# Patient Record
Sex: FEMALE | Race: WHITE | Employment: FULL TIME | URBAN - NONMETROPOLITAN AREA
[De-identification: names, ages, dates, MRNs, and addresses within clinical notes are randomized per-mention and may not be internally consistent; named-entity substitution may affect disease eponyms.]

---

## 2021-05-18 NOTE — PROGRESS NOTES
NPO after midnight except sip of water with heart/BP meds  Follow all instructions given by surgeon including medications to hold  Bring insurance card and photo ID  Shower the night before or morning of procedure with liquid antibacterial soap  Wear comfortable clothing  Do not bring jewelry or valuables  Bring list of medications with dosage and how often taken if not reviewed   needed at discharge at least 25years old  Call PAT at [phone] for questions    Instructed to call surgery center at [phone] upon arrival to speak with  before entering building. Covid screen due  at [facility] 6 to 7 days before procedure.  Pt plans to have completed on 5-18 at [facility] screening questionnaire complete and negative for symptoms or exposure see chart for documentation

## 2021-05-25 ENCOUNTER — ANESTHESIA (OUTPATIENT)
Dept: OPERATING ROOM | Age: 41
End: 2021-05-25
Payer: COMMERCIAL

## 2021-05-25 ENCOUNTER — ANESTHESIA EVENT (OUTPATIENT)
Dept: OPERATING ROOM | Age: 41
End: 2021-05-25
Payer: COMMERCIAL

## 2021-05-25 ENCOUNTER — HOSPITAL ENCOUNTER (OUTPATIENT)
Age: 41
Setting detail: OUTPATIENT SURGERY
Discharge: HOME | End: 2021-05-25
Attending: OBSTETRICS & GYNECOLOGY | Admitting: OBSTETRICS & GYNECOLOGY
Payer: COMMERCIAL

## 2021-05-25 VITALS
TEMPERATURE: 97.6 F | HEIGHT: 64 IN | OXYGEN SATURATION: 99 % | SYSTOLIC BLOOD PRESSURE: 105 MMHG | WEIGHT: 135 LBS | BODY MASS INDEX: 23.05 KG/M2 | DIASTOLIC BLOOD PRESSURE: 68 MMHG | HEART RATE: 62 BPM | RESPIRATION RATE: 15 BRPM

## 2021-05-25 VITALS
OXYGEN SATURATION: 98 % | SYSTOLIC BLOOD PRESSURE: 101 MMHG | DIASTOLIC BLOOD PRESSURE: 59 MMHG | RESPIRATION RATE: 11 BRPM

## 2021-05-25 DIAGNOSIS — N92.0 MENORRHAGIA WITH REGULAR CYCLE: Primary | ICD-10-CM

## 2021-05-25 LAB
PREGNANCY, URINE: NEGATIVE
PREGNANCY, URINE: NORMAL

## 2021-05-25 PROCEDURE — 6360000002 HC RX W HCPCS

## 2021-05-25 PROCEDURE — 3600000003 HC SURGERY LEVEL 3 BASE: Performed by: OBSTETRICS & GYNECOLOGY

## 2021-05-25 PROCEDURE — 3600000013 HC SURGERY LEVEL 3 ADDTL 15MIN: Performed by: OBSTETRICS & GYNECOLOGY

## 2021-05-25 PROCEDURE — 7100000001 HC PACU RECOVERY - ADDTL 15 MIN: Performed by: OBSTETRICS & GYNECOLOGY

## 2021-05-25 PROCEDURE — 6360000002 HC RX W HCPCS: Performed by: REGISTERED NURSE

## 2021-05-25 PROCEDURE — 7100000000 HC PACU RECOVERY - FIRST 15 MIN: Performed by: OBSTETRICS & GYNECOLOGY

## 2021-05-25 PROCEDURE — 88302 TISSUE EXAM BY PATHOLOGIST: CPT

## 2021-05-25 PROCEDURE — 88305 TISSUE EXAM BY PATHOLOGIST: CPT

## 2021-05-25 PROCEDURE — 7100000011 HC PHASE II RECOVERY - ADDTL 15 MIN: Performed by: OBSTETRICS & GYNECOLOGY

## 2021-05-25 PROCEDURE — 3700000000 HC ANESTHESIA ATTENDED CARE: Performed by: OBSTETRICS & GYNECOLOGY

## 2021-05-25 PROCEDURE — 2720000010 HC SURG SUPPLY STERILE: Performed by: OBSTETRICS & GYNECOLOGY

## 2021-05-25 PROCEDURE — 81025 URINE PREGNANCY TEST: CPT

## 2021-05-25 PROCEDURE — 3700000001 HC ADD 15 MINUTES (ANESTHESIA): Performed by: OBSTETRICS & GYNECOLOGY

## 2021-05-25 PROCEDURE — 7100000010 HC PHASE II RECOVERY - FIRST 15 MIN: Performed by: OBSTETRICS & GYNECOLOGY

## 2021-05-25 PROCEDURE — 2709999900 HC NON-CHARGEABLE SUPPLY: Performed by: OBSTETRICS & GYNECOLOGY

## 2021-05-25 PROCEDURE — 2500000003 HC RX 250 WO HCPCS: Performed by: OBSTETRICS & GYNECOLOGY

## 2021-05-25 RX ORDER — FENTANYL CITRATE 50 UG/ML
INJECTION, SOLUTION INTRAMUSCULAR; INTRAVENOUS PRN
Status: DISCONTINUED | OUTPATIENT
Start: 2021-05-25 | End: 2021-05-25 | Stop reason: SDUPTHER

## 2021-05-25 RX ORDER — FENTANYL CITRATE 50 UG/ML
25 INJECTION, SOLUTION INTRAMUSCULAR; INTRAVENOUS EVERY 5 MIN PRN
Status: DISCONTINUED | OUTPATIENT
Start: 2021-05-25 | End: 2021-05-25 | Stop reason: HOSPADM

## 2021-05-25 RX ORDER — MEPERIDINE HYDROCHLORIDE 25 MG/ML
12.5 INJECTION INTRAMUSCULAR; INTRAVENOUS; SUBCUTANEOUS EVERY 5 MIN PRN
Status: DISCONTINUED | OUTPATIENT
Start: 2021-05-25 | End: 2021-05-25 | Stop reason: HOSPADM

## 2021-05-25 RX ORDER — DEXAMETHASONE SODIUM PHOSPHATE 10 MG/ML
INJECTION, EMULSION INTRAMUSCULAR; INTRAVENOUS PRN
Status: DISCONTINUED | OUTPATIENT
Start: 2021-05-25 | End: 2021-05-25 | Stop reason: SDUPTHER

## 2021-05-25 RX ORDER — LABETALOL 20 MG/4 ML (5 MG/ML) INTRAVENOUS SYRINGE
5 EVERY 10 MIN PRN
Status: DISCONTINUED | OUTPATIENT
Start: 2021-05-25 | End: 2021-05-25 | Stop reason: HOSPADM

## 2021-05-25 RX ORDER — HYDROCODONE BITARTRATE AND ACETAMINOPHEN 5; 325 MG/1; MG/1
1 TABLET ORAL EVERY 4 HOURS PRN
Status: DISCONTINUED | OUTPATIENT
Start: 2021-05-25 | End: 2021-05-25 | Stop reason: HOSPADM

## 2021-05-25 RX ORDER — SUCCINYLCHOLINE CHLORIDE 20 MG/ML
INJECTION INTRAMUSCULAR; INTRAVENOUS PRN
Status: DISCONTINUED | OUTPATIENT
Start: 2021-05-25 | End: 2021-05-25 | Stop reason: SDUPTHER

## 2021-05-25 RX ORDER — IBUPROFEN 800 MG/1
800 TABLET ORAL EVERY 6 HOURS PRN
Status: DISCONTINUED | OUTPATIENT
Start: 2021-05-25 | End: 2021-05-25 | Stop reason: HOSPADM

## 2021-05-25 RX ORDER — MORPHINE SULFATE 2 MG/ML
2 INJECTION, SOLUTION INTRAMUSCULAR; INTRAVENOUS
Status: DISCONTINUED | OUTPATIENT
Start: 2021-05-25 | End: 2021-05-25 | Stop reason: HOSPADM

## 2021-05-25 RX ORDER — ACETAMINOPHEN 325 MG/1
650 TABLET ORAL EVERY 4 HOURS PRN
Status: DISCONTINUED | OUTPATIENT
Start: 2021-05-25 | End: 2021-05-25 | Stop reason: HOSPADM

## 2021-05-25 RX ORDER — MIDAZOLAM HYDROCHLORIDE 1 MG/ML
INJECTION INTRAMUSCULAR; INTRAVENOUS PRN
Status: DISCONTINUED | OUTPATIENT
Start: 2021-05-25 | End: 2021-05-25 | Stop reason: SDUPTHER

## 2021-05-25 RX ORDER — ONDANSETRON 2 MG/ML
INJECTION INTRAMUSCULAR; INTRAVENOUS PRN
Status: DISCONTINUED | OUTPATIENT
Start: 2021-05-25 | End: 2021-05-25 | Stop reason: SDUPTHER

## 2021-05-25 RX ORDER — HYDROCODONE BITARTRATE AND ACETAMINOPHEN 5; 325 MG/1; MG/1
1 TABLET ORAL EVERY 6 HOURS PRN
Qty: 12 TABLET | Refills: 0 | Status: SHIPPED | OUTPATIENT
Start: 2021-05-25 | End: 2021-05-28

## 2021-05-25 RX ORDER — HYDROCODONE BITARTRATE AND ACETAMINOPHEN 5; 325 MG/1; MG/1
2 TABLET ORAL EVERY 4 HOURS PRN
Status: DISCONTINUED | OUTPATIENT
Start: 2021-05-25 | End: 2021-05-25 | Stop reason: HOSPADM

## 2021-05-25 RX ORDER — MORPHINE SULFATE 2 MG/ML
4 INJECTION, SOLUTION INTRAMUSCULAR; INTRAVENOUS
Status: DISCONTINUED | OUTPATIENT
Start: 2021-05-25 | End: 2021-05-25 | Stop reason: HOSPADM

## 2021-05-25 RX ORDER — PROPOFOL 10 MG/ML
INJECTION, EMULSION INTRAVENOUS PRN
Status: DISCONTINUED | OUTPATIENT
Start: 2021-05-25 | End: 2021-05-25 | Stop reason: SDUPTHER

## 2021-05-25 RX ORDER — PROMETHAZINE HYDROCHLORIDE 25 MG/ML
12.5 INJECTION, SOLUTION INTRAMUSCULAR; INTRAVENOUS
Status: DISCONTINUED | OUTPATIENT
Start: 2021-05-25 | End: 2021-05-25 | Stop reason: HOSPADM

## 2021-05-25 RX ORDER — KETOROLAC TROMETHAMINE 30 MG/ML
INJECTION, SOLUTION INTRAMUSCULAR; INTRAVENOUS PRN
Status: DISCONTINUED | OUTPATIENT
Start: 2021-05-25 | End: 2021-05-25 | Stop reason: SDUPTHER

## 2021-05-25 RX ORDER — SODIUM CHLORIDE, SODIUM LACTATE, POTASSIUM CHLORIDE, CALCIUM CHLORIDE 600; 310; 30; 20 MG/100ML; MG/100ML; MG/100ML; MG/100ML
INJECTION, SOLUTION INTRAVENOUS SEE ADMIN INSTRUCTIONS
Status: DISCONTINUED | OUTPATIENT
Start: 2021-05-25 | End: 2021-05-25 | Stop reason: HOSPADM

## 2021-05-25 RX ORDER — KETOROLAC TROMETHAMINE 30 MG/ML
INJECTION, SOLUTION INTRAMUSCULAR; INTRAVENOUS
Status: COMPLETED
Start: 2021-05-25 | End: 2021-05-25

## 2021-05-25 RX ORDER — BUPIVACAINE HYDROCHLORIDE 5 MG/ML
INJECTION, SOLUTION PERINEURAL PRN
Status: DISCONTINUED | OUTPATIENT
Start: 2021-05-25 | End: 2021-05-25 | Stop reason: ALTCHOICE

## 2021-05-25 RX ORDER — FENTANYL CITRATE 50 UG/ML
50 INJECTION, SOLUTION INTRAMUSCULAR; INTRAVENOUS EVERY 5 MIN PRN
Status: DISCONTINUED | OUTPATIENT
Start: 2021-05-25 | End: 2021-05-25 | Stop reason: HOSPADM

## 2021-05-25 RX ADMIN — FENTANYL CITRATE 100 MCG: 50 INJECTION, SOLUTION INTRAMUSCULAR; INTRAVENOUS at 08:15

## 2021-05-25 RX ADMIN — KETOROLAC TROMETHAMINE 30 MG: 30 INJECTION, SOLUTION INTRAMUSCULAR at 08:53

## 2021-05-25 RX ADMIN — DEXAMETHASONE SODIUM PHOSPHATE 10 MG: 10 INJECTION, EMULSION INTRAMUSCULAR; INTRAVENOUS at 08:15

## 2021-05-25 RX ADMIN — PROPOFOL 200 MG: 10 INJECTION, EMULSION INTRAVENOUS at 08:15

## 2021-05-25 RX ADMIN — KETOROLAC TROMETHAMINE: 30 INJECTION, SOLUTION INTRAMUSCULAR at 08:08

## 2021-05-25 RX ADMIN — SUCCINYLCHOLINE CHLORIDE 120 MG: 20 INJECTION, SOLUTION INTRAMUSCULAR; INTRAVENOUS at 08:18

## 2021-05-25 RX ADMIN — MIDAZOLAM 2 MG: 1 INJECTION INTRAMUSCULAR; INTRAVENOUS at 08:15

## 2021-05-25 RX ADMIN — ONDANSETRON HYDROCHLORIDE 4 MG: 4 INJECTION, SOLUTION INTRAMUSCULAR; INTRAVENOUS at 08:53

## 2021-05-25 ASSESSMENT — PULMONARY FUNCTION TESTS
PIF_VALUE: 2
PIF_VALUE: 20
PIF_VALUE: 12
PIF_VALUE: 12
PIF_VALUE: 2
PIF_VALUE: 12
PIF_VALUE: 2
PIF_VALUE: 21
PIF_VALUE: 12
PIF_VALUE: 0
PIF_VALUE: 1
PIF_VALUE: 2
PIF_VALUE: 2
PIF_VALUE: 12
PIF_VALUE: 13
PIF_VALUE: 0
PIF_VALUE: 1
PIF_VALUE: 1
PIF_VALUE: 21
PIF_VALUE: 12
PIF_VALUE: 2
PIF_VALUE: 2
PIF_VALUE: 13
PIF_VALUE: 1
PIF_VALUE: 20
PIF_VALUE: 21
PIF_VALUE: 2
PIF_VALUE: 12
PIF_VALUE: 0
PIF_VALUE: 12
PIF_VALUE: 13
PIF_VALUE: 12

## 2021-05-25 ASSESSMENT — PAIN SCALES - GENERAL
PAINLEVEL_OUTOF10: 0
PAINLEVEL_OUTOF10: 2

## 2021-05-25 ASSESSMENT — PAIN DESCRIPTION - LOCATION: LOCATION: ABDOMEN

## 2021-05-25 ASSESSMENT — PAIN - FUNCTIONAL ASSESSMENT: PAIN_FUNCTIONAL_ASSESSMENT: 0-10

## 2021-05-25 ASSESSMENT — PAIN DESCRIPTION - DESCRIPTORS: DESCRIPTORS: BURNING;CRAMPING

## 2021-05-25 ASSESSMENT — PAIN DESCRIPTION - PAIN TYPE: TYPE: SURGICAL PAIN

## 2021-05-25 NOTE — H&P
Department of  Obstetrics and Gynecology  History and Physical  Date of Admission:  2021    CHIEF COMPLAINT:   Heavy bleeding    History obtained from patient    HISTORY OF PRESENT ILLNESS:     The patient is a 45 y.o. female with significant past medical history of heavy bleeding who presents with worsening sx, no improvement with mirena. She has completed child bearing and desires surgical management. Past Medical History:    History reviewed. No pertinent past medical history. Past Surgical History:        Procedure Laterality Date     SECTION      DILATION AND CURETTAGE OF UTERUS      x2         meds:No current facility-administered medications for this encounter. Allergies:  Bravelle [urofollitropin]     Social History:  TOBACCO:   reports that she has never smoked. She has never used smokeless tobacco.  ETOH:   reports current alcohol use. DRUGS:   reports no history of drug use. Family History:   History reviewed. No pertinent family history.      PHYSICAL EXAM:    Vitals:  /69   Pulse 64   Temp 97.9 °F (36.6 °C) (Temporal)   Resp 16   Ht 5' 4\" (1.626 m)   Wt 135 lb (61.2 kg)   LMP 2021   SpO2 100%   BMI 23.17 kg/m²     CONSTITUTIONAL:  awake, alert, cooperative, no apparent distress, and appears stated age  ABDOMEN:  Soft, NT  {GYN PELVIC EXAM: see office exam    DATA:  Labs:  CBC: No results found for: WBC, RBC, HGB, HCT, MCV, RDW, PLT  1st pregnancy test entered in error it was Negative      IMPRESSION/RECOMMENDATIONS:   41yo with menorrhagia  - Plan for L/S BS, hysteroscopy, D&C, Tessa Hill MD

## 2021-05-25 NOTE — BRIEF OP NOTE
Brief Operative Report      Pre-operative Diagnosis:  menorrhagia    Post-operative Diagnosis:  Same    Procedure:  L/S BS, Hysteroscopy, D&C, IUD removal, YAO    Surgeon: KAYLEE Kwan MD     Anesthesia:  General endotrachial anesthesia    Estimated blood loss:  Minimal     Findings: See Operative Dictation, Normal adnexa, 1cm right paratubal cyst    Complications:  none      See dictated operative report for full details.       Judie Rendon MD

## 2021-05-25 NOTE — ANESTHESIA PRE PROCEDURE
Department of Anesthesiology  Preprocedure Note       Name:  Una Fleischer   Age:  45 y.o.  :  1980                                          MRN:  [id number]         Date:  2021      Surgeon: Eugenio Monreal):  Juan Carlos Rothman MD    Procedure: Procedure(s):  D & C, HYSTEROSCOPY, ENDOMETRIAL ABLATION, LAPAROSCOPIC BILAT SALPINGECTOMY    Medications prior to admission:   Prior to Admission medications    Medication Sig Start Date End Date Taking? Authorizing Provider   [employer], Dioscorea villosa, (WILD YAM PO) Take  by mouth. Historical Provider, MD       Current medications:    No current facility-administered medications for this encounter. Allergies: Allergies   Allergen Reactions    Bravelle [Urofollitropin] Swelling       Problem List:    Patient Active Problem List   Diagnosis Code    Other procreative management counseling and advice Z31.69    Recurrent pregnancy loss, antepartum condition or complication C97.47    Hereditary disease in family possibly affecting fetus, affecting management of mother, antepartum condition or complication N72. 2XX0    Other current maternal conditions classifiable elsewhere, antepartum O99.891    Coagulation defect affecting pregnancy, antepartum ([facility] [address] O99.119, D68.9       Past Medical History:  No past medical history on file. Past Surgical History:        Procedure Laterality Date     SECTION      DILATION AND CURETTAGE OF UTERUS      x2       Social History:    Social History     Tobacco Use    Smoking status: Former Smoker     Quit date:      Years since quittin.4    Smokeless tobacco: Never Used   Substance Use Topics    Alcohol use:  Yes                                Counseling given: Not Answered      Vital Signs (Current):   Vitals:    21 0850 21 0724   BP:  102/69   Pulse:  64   Resp:  16   Temp:  97.9 °F (36.6 °C)   TempSrc:  Temporal   SpO2:  100%   Weight: 135 lb (61.2 kg) 135 lb (61.2 kg)   Height: 5' 4\" intravenous. MIPS: Postoperative opioids intended and Prophylactic antiemetics administered. Anesthetic plan and risks discussed with patient. Plan discussed with CRNA.                   [facility], DO   5/25/2021

## 2021-05-25 NOTE — PROGRESS NOTES
2465  at side. Easy to arouse, sleeping with easy respirations. Dimas Jaylongger reapplied in phase 2. Denies needs. States pain 3-4/10. Pain meds offered if needed. Refused at this time. 0932 Snacks given, sleeping. 1000 Patient remains sleeping, awakens on occasion for drinks. Denies needs. 1033 Reactive, sleeping well, states \"feel pretty good\"  [id number] Contacts for eyes given per , patient denies needs. Taking water and snacks. 801 [facility] for discharge, héctor pad given. Old pad with small amount cherry drainage. New pad given. 1115 Discharge instructions given, understanding voiced, questions answered. 1125 Out to car after voiding without difficulty. Denies needs.

## 2021-05-25 NOTE — OP NOTE
Gyn Service    Operative Report        Pt Name: Cassandra Shankar  MRN: [id number] [place] #: [de-identified]  YOB: 1980  Procedure Performed By: Cheryl Morrison MD, MD      Pre-operative Diagnosis:  MENORRHAGIA/MENOMETORRHAGIA    Post-operative Diagnosis:  SAME    Procedure:  HYSTEROSCOPY, D AND C, CAITLYN ENDOMETRIAL ABLATION, L/S BILATERAL SALPINGECTOMY, IUD REMOVAL    Surgeon:  KAYLEE Vergara MD     Anesthesia:  General    Estimated blood loss: less than 50 ml    Findings:  Ut sound to 10 cm     Complications:  NONE      Procedure: The patient was taken to the operating room where she was placed  under general anesthesia in dorsolithotomy position, prepped and draped. On  exam, the cervix was mobile and in mid position. The cervix was grasped with  a tenaculum and sounded to 10 cm and was progressively dilated. A manipulator was placed in the uterine cavity and attention was turned to the abdomen. The umbilicus was injected with marcaine and incised with a scalpel. A 5mm trochar was placed into the abdomen and it was insuflated with CO2 gas. A 2nd and 3rd 5mm trocar were placed in the suprapubic region and LLQ. The atraumatic grasper was uses to grasp the Fallopian tubes and they were ligated along the mesosalpinx with the Ligasure device to the level of the cornua where they were transected. Both tubes were removed through the suprapubic port. There was a 1cm simple right paratubal cyst that was removed with the specimen. The abdomen was manually exuflated and the skin was closed with 4-0 vicryl subcuticular stitch. Attention was then turned to the vaginal portion of the procedure. The IUD was grasped with a ring and removed in tact. A hysteroscope was placed and saline was used as the distension medium. Abnormalities of the uterus were not noted. A sharp curettage then took place until a gritty texture was noted throughout the cavity.   The Caitlyn device  was then placed, the intracervical balloon inflated, and the cavity assessment was passed successfully. The ablation was then completed. Once completed the balloon was deflated and the Caitlyn removed from the uterine cavity. When it was completed the patient was awakened from general anesthesia and taken to the Recovery Room in good condition. Sponge, lap and needle counts were correct x2.      @[facility]@

## 2022-11-17 ENCOUNTER — HOSPITAL ENCOUNTER (EMERGENCY)
Age: 42
Discharge: HOME | End: 2022-11-17
Attending: EMERGENCY MEDICINE
Payer: COMMERCIAL

## 2022-11-17 VITALS
DIASTOLIC BLOOD PRESSURE: 72 MMHG | HEART RATE: 85 BPM | WEIGHT: 140 LBS | SYSTOLIC BLOOD PRESSURE: 100 MMHG | TEMPERATURE: 97.4 F | HEIGHT: 64 IN | RESPIRATION RATE: 18 BRPM | BODY MASS INDEX: 23.9 KG/M2 | OXYGEN SATURATION: 96 %

## 2022-11-17 DIAGNOSIS — N30.01 ACUTE CYSTITIS WITH HEMATURIA: Primary | ICD-10-CM

## 2022-11-17 LAB
AMORPHOUS: ABNORMAL
BACTERIA: ABNORMAL
BILIRUBIN URINE: NEGATIVE
BLOOD, URINE: ABNORMAL
CASTS UA: ABNORMAL /LPF
CHARACTER, URINE: ABNORMAL
COLOR: YELLOW
CRYSTALS, UA: ABNORMAL
EPITHELIAL CELLS, UA: ABNORMAL /HPF
GLUCOSE, URINE: NEGATIVE MG/DL
KETONES, URINE: NEGATIVE
LEUKOCYTE ESTERASE, URINE: ABNORMAL
MUCUS: ABNORMAL
NITRITE, URINE: NEGATIVE
PH UA: 6 (ref 5–9)
PROTEIN UA: ABNORMAL MG/DL
RBC UA: ABNORMAL /HPF
REFLEX TO URINE C & S: ABNORMAL
SPECIFIC GRAVITY UA: 1.01 (ref 1–1.03)
UROBILINOGEN, URINE: 0.2 EU/DL (ref 0–1)
WBC UA: ABNORMAL /HPF

## 2022-11-17 PROCEDURE — 81001 URINALYSIS AUTO W/SCOPE: CPT

## 2022-11-17 PROCEDURE — 87086 URINE CULTURE/COLONY COUNT: CPT

## 2022-11-17 PROCEDURE — 99283 EMERGENCY DEPT VISIT LOW MDM: CPT | Performed by: EMERGENCY MEDICINE

## 2022-11-17 PROCEDURE — 6370000000 HC RX 637 (ALT 250 FOR IP): Performed by: EMERGENCY MEDICINE

## 2022-11-17 RX ORDER — PHENAZOPYRIDINE HYDROCHLORIDE 100 MG/1
100 TABLET, FILM COATED ORAL ONCE
Status: COMPLETED | OUTPATIENT
Start: 2022-11-17 | End: 2022-11-17

## 2022-11-17 RX ORDER — ONDANSETRON 4 MG/1
4 TABLET, ORALLY DISINTEGRATING ORAL EVERY 8 HOURS PRN
Qty: 15 TABLET | Refills: 0 | Status: SHIPPED | OUTPATIENT
Start: 2022-11-17

## 2022-11-17 RX ORDER — SULFAMETHOXAZOLE AND TRIMETHOPRIM 800; 160 MG/1; MG/1
1 TABLET ORAL 2 TIMES DAILY
Qty: 14 TABLET | Refills: 0 | Status: SHIPPED | OUTPATIENT
Start: 2022-11-17 | End: 2022-11-24

## 2022-11-17 RX ORDER — SULFAMETHOXAZOLE AND TRIMETHOPRIM 800; 160 MG/1; MG/1
1 TABLET ORAL ONCE
Status: COMPLETED | OUTPATIENT
Start: 2022-11-17 | End: 2022-11-17

## 2022-11-17 RX ORDER — PHENAZOPYRIDINE HYDROCHLORIDE 100 MG/1
100 TABLET, FILM COATED ORAL 3 TIMES DAILY PRN
Qty: 10 TABLET | Refills: 0 | Status: SHIPPED | OUTPATIENT
Start: 2022-11-17 | End: 2022-11-20

## 2022-11-17 RX ADMIN — PHENAZOPYRIDINE HYDROCHLORIDE 100 MG: 100 TABLET ORAL at 20:11

## 2022-11-17 RX ADMIN — SULFAMETHOXAZOLE AND TRIMETHOPRIM 1 TABLET: 800; 160 TABLET ORAL at 20:11

## 2022-11-17 NOTE — Clinical Note
Toma Godinez was seen and treated in our emergency department on 11/17/2022. She may return to work on 11/18/2022. If you have any questions or concerns, please don't hesitate to call.       Janes Bonilla RN

## 2022-11-18 NOTE — ED NOTES
meds administered. Education complete. Discharge teaching and instructions for condition explained to patient. AVS reviewed. Went over prescriptions with patient. Patient voiced understanding regarding prescriptions, follow up appointments and care of self at home. Pt discharged to home in stable condition per self.        Vitor Luong RN  11/17/22 2015

## 2022-11-18 NOTE — DISCHARGE INSTRUCTIONS
Increase fluids at home. Take Bactrim twice a day. Next dose tomorrow morning. Take Zofran for any nausea. Take prior to decrease pain or burning. Next dose tomorrow. Monitor for high fever worsening symptoms or progression.

## 2022-11-18 NOTE — ED PROVIDER NOTES
[address] [facility]  [place] [address]  Phone: [address]    Chief Complaint   Patient presents with    Dysuria    Urinary Frequency       HPI    Carmen Byrd is a 40 y.o. female who presents above-noted complaint. Patient actually had a rough week. General sore throat not feeling good. She now has developed some urinary symptoms. This is her first day without a fever. She denies headache neck pain chest pain or other problems. PAST MEDICAL HISTORY    Past Medical History:   Diagnosis Date    Depression        SURGICAL HISTORY    Past Surgical History:   Procedure Laterality Date     SECTION      DILATION AND CURETTAGE OF UTERUS      x2    DILATION AND CURETTAGE OF UTERUS N/A 2021    D & C, HYSTEROSCOPY, ENDOMETRIAL ABLATION, LAPAROSCOPIC BILAT SALPINGECTOMY performed by Ricarda Snyder MD at [address] [place]    Current Outpatient Rx   Medication Sig Dispense Refill    Escitalopram Oxalate (LEXAPRO PO) Take by mouth      sulfamethoxazole-trimethoprim (BACTRIM DS) 800-160 MG per tablet Take 1 tablet by mouth 2 times daily for 7 days 14 tablet 0    ondansetron (ZOFRAN ODT) 4 MG disintegrating tablet Take 1 tablet by mouth every 8 hours as needed for Nausea or Vomiting 15 tablet 0    phenazopyridine (PYRIDIUM) 100 MG tablet Take 1 tablet by mouth 3 times daily as needed for Pain 10 tablet 0    Wild Yam, Dioscorea villosa, (WILD YAM PO) Take  by mouth. ALLERGIES    Allergies   Allergen Reactions    Bravelle [Urofollitropin] Swelling       FAMILY HISTORY    History reviewed. No pertinent family history.     SOCIAL HISTORY    Social History     Socioeconomic History    Marital status:      Spouse name: None    Number of children: None    Years of education: None    Highest education level: None   Tobacco Use    Smoking status: Never    Smokeless tobacco: Never Vaping Use    Vaping Use: Never used   Substance and Sexual Activity    Alcohol use: Yes     Comment: social    Drug use: Never       REVIEW OF SYSTEMS    Positive for dysuria and frequency. Positive for recent URI. No melena  All systems negative except as marked. PHYSICAL EXAM    VITAL SIGNS: /72   Pulse 85   Temp 97.4 °F (36.3 °C)   Resp 18   Ht 5' 4\" (1.626 m)   Wt 140 lb (63.5 kg)   SpO2 96%   BMI 24.03 kg/m²    Constitutional:  Alert not toxtic or ill, able to give coherent history normal speech  HENT:  Normocephalic, Atraumatic, oropharynx is normal  Cervical Spine: Normal range of motion,  No stridor. Eyes:  No discharge or  Swelling  Respiratory: No respiratory distress, clear  Abdomen is nontender  Musculoskeletal: No edema   integument:  Warm, Dry, No erythema, No rash (on exposed areas)   Neurologic:  Alert & appropriate   Psychiatric:  Affect normal    EKG                      RADIOLOGY    No orders to display           SCREENINGS  /72   Pulse 85   Temp 97.4 °F (36.3 °C)   Resp 18   Ht 5' 4\" (1.626 m)   Wt 140 lb (63.5 kg)   SpO2 96%   BMI 24.03 kg/m²      No orders to display       Screening For Hypertension and Follow-up (#317)  patient informed that blood pressure is normal but should always be re-assessed by primary care      Screening For Tobacco Use and Cessation Intervention (#226):   reports that she has never smoked. She has never used smokeless tobacco.  Non-smoker not applicable for screen      PROCEDURES    none      CONSULTS:  None        ED COURSE & MEDICAL DECISION MAKING    Pertinent Labs & Imaging studies reviewed. (See chart for details)  Patient presents with recent URI not feeling good body aches and fever. Now has developed some urinary symptoms. Fevers improved although has frequency and urgency. No associate symptoms such as vomiting or severe back pain to suggest pyelonephritis or other findings. Abdominal exam is benign.   Checking urine.      Urine positive for infection. Treating with antibiotics. FINAL IMPRESSION    1.  Acute cystitis with hematuria         PATIENT REFERRED TO:  RAMSES Luciano - CNP  [address] [facility] [address]  [phone]    Call   Follow up from ER condition    DISCHARGE MEDICATIONS:  New Prescriptions    ONDANSETRON (ZOFRAN ODT) 4 MG DISINTEGRATING TABLET    Take 1 tablet by mouth every 8 hours as needed for Nausea or Vomiting    PHENAZOPYRIDINE (PYRIDIUM) 100 MG TABLET    Take 1 tablet by mouth 3 times daily as needed for Pain    SULFAMETHOXAZOLE-TRIMETHOPRIM (BACTRIM DS) 800-160 MG PER TABLET    Take 1 tablet by mouth 2 times daily for 7 days           Lawrence Lafleur MD  11/17/22 2009

## 2022-11-18 NOTE — ED NOTES
Presents per self. C/o dysuria ongoing for 2 days with frequency post a week of having \"flu like s/s\". Pt states she is feeling much better today with her flu but the pressure of needing to urinate is rated a 5/10. Pale generalized. Triage exam room 5. Respirations easy and unlabored.       Avel Kramer RN  11/17/22 1954

## 2022-11-19 LAB
ORGANISM: ABNORMAL
URINE CULTURE REFLEX: ABNORMAL

## 2022-11-25 ENCOUNTER — HOSPITAL ENCOUNTER (EMERGENCY)
Age: 42
Discharge: HOME | End: 2022-11-25
Attending: EMERGENCY MEDICINE
Payer: COMMERCIAL

## 2022-11-25 VITALS
RESPIRATION RATE: 18 BRPM | HEART RATE: 107 BPM | TEMPERATURE: 99.8 F | BODY MASS INDEX: 24.03 KG/M2 | SYSTOLIC BLOOD PRESSURE: 113 MMHG | DIASTOLIC BLOOD PRESSURE: 64 MMHG | OXYGEN SATURATION: 97 % | WEIGHT: 140 LBS

## 2022-11-25 DIAGNOSIS — R30.0 DYSURIA: Primary | ICD-10-CM

## 2022-11-25 DIAGNOSIS — N39.0 URINARY TRACT INFECTION WITHOUT HEMATURIA, SITE UNSPECIFIED: ICD-10-CM

## 2022-11-25 LAB
AMORPHOUS: ABNORMAL
ANION GAP: 10 MEQ/L (ref 8–16)
BACTERIA: ABNORMAL
BASOPHILS # BLD: 0.2 % (ref 0–3)
BASOPHILS ABSOLUTE: 0 THOU/MM3 (ref 0–0.1)
BILIRUBIN URINE: NEGATIVE
BLOOD, URINE: ABNORMAL
BUN BLDV-MCNC: 8 MG/DL (ref 7–18)
CASTS UA: ABNORMAL /LPF
CHARACTER, URINE: ABNORMAL
CHLORIDE BLD-SCNC: 102 MEQ/L (ref 98–107)
CO2: 25 MEQ/L (ref 21–32)
COLOR: ABNORMAL
CREAT SERPL-MCNC: 0.8 MG/DL (ref 0.6–1.3)
CRYSTALS, UA: ABNORMAL
EOSINOPHILS ABSOLUTE: 0 THOU/MM3 (ref 0–0.5)
EOSINOPHILS RELATIVE PERCENT: 0.2 % (ref 0–4)
EPITHELIAL CELLS, UA: ABNORMAL /HPF
FLU A ANTIGEN: NEGATIVE
FLU B ANTIGEN: NEGATIVE
GFR, ESTIMATED: > 60 ML/MIN/1.73M2
GLUCOSE BLD-MCNC: 133 MG/DL (ref 74–106)
GLUCOSE, URINE: NEGATIVE MG/DL
HCT VFR BLD CALC: 38.8 % (ref 37–47)
HEMOGLOBIN: 12.9 GM/DL (ref 12–16)
IMMATURE GRANS (ABS): 0.03 THOU/MM3 (ref 0–0.07)
IMMATURE GRANULOCYTES: 1 %
KETONES, URINE: NEGATIVE
LEUKOCYTE ESTERASE, URINE: ABNORMAL
LYMPHOCYTES # BLD: 8.9 % (ref 15–47)
LYMPHOCYTES ABSOLUTE: 0.4 THOU/MM3 (ref 1–4.8)
MCH RBC QN AUTO: 29.2 PG (ref 26–32)
MCHC RBC AUTO-ENTMCNC: 33.2 GM/DL (ref 31–35)
MCV RBC AUTO: 87.8 FL (ref 81–99)
MONOCYTES: 0.4 THOU/MM3 (ref 0.3–1.3)
MONOCYTES: 9.1 % (ref 0–12)
MUCUS: ABNORMAL
NITRITE, URINE: NEGATIVE
PDW BLD-RTO: 12 % (ref 11.5–14.9)
PH UA: 6.5 (ref 5–9)
PLATELET # BLD: 225 THOU/MM3 (ref 130–400)
PMV BLD AUTO: 8.5 FL (ref 9.4–12.4)
POC CALCIUM: 7.9 MG/DL (ref 8.5–10.1)
POTASSIUM SERPL-SCNC: 3.7 MEQ/L (ref 3.5–5.1)
PREGNANCY, SERUM: NEGATIVE
PROTEIN UA: ABNORMAL MG/DL
RBC # BLD: 4.42 MILL/MM3 (ref 4.1–5.3)
RBC UA: ABNORMAL /HPF
REFLEX TO URINE C & S: ABNORMAL
SARS-COV-2, NAAT: NOT  DETECTED
SEG NEUTROPHILS: 80.9 % (ref 43–75)
SEGMENTED NEUTROPHILS ABSOLUTE COUNT: 3.5 THOU/MM3 (ref 1.8–7.7)
SODIUM BLD-SCNC: 137 MEQ/L (ref 136–145)
SPECIFIC GRAVITY UA: >= 1.03 (ref 1–1.03)
UROBILINOGEN, URINE: 0.2 EU/DL (ref 0–1)
WBC # BLD: 4.3 THOU/MM3 (ref 4.8–10.8)
WBC UA: ABNORMAL /HPF

## 2022-11-25 PROCEDURE — 81001 URINALYSIS AUTO W/SCOPE: CPT

## 2022-11-25 PROCEDURE — 87804 INFLUENZA ASSAY W/OPTIC: CPT

## 2022-11-25 PROCEDURE — 99283 EMERGENCY DEPT VISIT LOW MDM: CPT | Performed by: EMERGENCY MEDICINE

## 2022-11-25 PROCEDURE — 87635 SARS-COV-2 COVID-19 AMP PRB: CPT

## 2022-11-25 PROCEDURE — 6370000000 HC RX 637 (ALT 250 FOR IP): Performed by: EMERGENCY MEDICINE

## 2022-11-25 PROCEDURE — 84703 CHORIONIC GONADOTROPIN ASSAY: CPT

## 2022-11-25 PROCEDURE — 87086 URINE CULTURE/COLONY COUNT: CPT

## 2022-11-25 PROCEDURE — 85025 COMPLETE CBC W/AUTO DIFF WBC: CPT

## 2022-11-25 PROCEDURE — 80048 BASIC METABOLIC PNL TOTAL CA: CPT

## 2022-11-25 RX ORDER — CIPROFLOXACIN 500 MG/1
500 TABLET, FILM COATED ORAL 2 TIMES DAILY
Qty: 14 TABLET | Refills: 0 | Status: SHIPPED | OUTPATIENT
Start: 2022-11-25 | End: 2022-12-02

## 2022-11-25 RX ORDER — IBUPROFEN 200 MG
600 TABLET ORAL ONCE
Status: COMPLETED | OUTPATIENT
Start: 2022-11-25 | End: 2022-11-25

## 2022-11-25 RX ORDER — CIPROFLOXACIN 500 MG/1
500 TABLET, FILM COATED ORAL ONCE
Status: COMPLETED | OUTPATIENT
Start: 2022-11-25 | End: 2022-11-25

## 2022-11-25 RX ADMIN — IBUPROFEN 600 MG: 200 TABLET, FILM COATED ORAL at 22:05

## 2022-11-25 RX ADMIN — CIPROFLOXACIN 500 MG: 500 TABLET, FILM COATED ORAL at 22:01

## 2022-11-25 ASSESSMENT — ENCOUNTER SYMPTOMS
SORE THROAT: 0
VOMITING: 0
BACK PAIN: 0
SHORTNESS OF BREATH: 0
COUGH: 0
RHINORRHEA: 0
ABDOMINAL PAIN: 0
DIARRHEA: 0
NAUSEA: 0
SINUS PAIN: 0
EYE REDNESS: 0

## 2022-11-25 ASSESSMENT — PAIN - FUNCTIONAL ASSESSMENT: PAIN_FUNCTIONAL_ASSESSMENT: 0-10

## 2022-11-25 ASSESSMENT — PAIN DESCRIPTION - LOCATION: LOCATION: GENERALIZED

## 2022-11-25 ASSESSMENT — PAIN SCALES - GENERAL: PAINLEVEL_OUTOF10: 2

## 2022-11-26 NOTE — ED NOTES
Discharge teaching and instructions for condition explained to patient. AVS reviewed. Went over prescriptions with patient. Patient voiced understanding regarding prescriptions, follow up appointments and care of self at home. Pt discharged to home in stable condition per self.        Elizabet Newman RN  11/25/22 4912

## 2022-11-26 NOTE — DISCHARGE INSTRUCTIONS
Patient has what appears to be a urinary tract infection. Patient has been given Cipro she is instructed to take it as prescribed. She has been given 1 dose of Diflucan she is instructed to take that at the end of her antibiotic regimen. She is instructed to follow-up with a primary care physician and do so within the next 1 to 2 days. She is instructed to continue to take all medications as prescribed. She is instructed return to the nearest emergency room immediately for any new or worsening complaints.

## 2022-11-26 NOTE — ED NOTES
Pt presents with c/o recent UTI given antibiotic. Says she f/u with PCP who did vaginal exam also and treated with valtrex. Pt says the UTI s/s have returned and she has a fever and remains having chills and overall not feeling well. Triage exam room 5. Urine sample to lab.      Douglas Mc RN  11/25/22 4075

## 2022-11-26 NOTE — ED PROVIDER NOTES
I performed a history and physical examination of the patient and discussed management with the resident. I reviewed the residents note and agree with the documented findings and plan of care. Any areas of disagreement are noted on the chart. I was personally present for the key portions of any procedures. I have documented in the chart those procedures where I was not present during the key portions. I have reviewed the emergency nurses triage note. I agree with the chief complaint, past medical history, past surgical history, allergies, medications, social and family history as documented unless otherwise noted below. Documentation of the HPI, Physical Exam and Medical Decision Making performed by medical students or scribes is based on my personal performance of the HPI, PE and MDM. For Phys Assistant/ Nurse Practitioner cases/documentation I have personally evaluated this patient and have completed at least one if not all key elements of the E/M (history, physical exam, and MDM). My findings are as noted below     Patient presenting for aches, chills, subjective fevers. Patient was apparently seen here earlier in the month diagnosed with an acute cystitis, patient was placed on antibiotics. Patient states that she was getting better and then she started to worsen. Patient denies any overt abdominal pain, patient has no vaginal bleeding or discharge. Patient is otherwise resting comfortably on the cot no apparent distress.       No orders to display     Labs Reviewed   URINALYSIS WITH REFLEX TO CULTURE - Abnormal; Notable for the following components:       Result Value    Blood, Urine MODERATE (*)     Protein, UA TRACE (*)     Leukocyte Esterase, Urine SMALL (*)     Character, Urine CLOUDY (*)     All other components within normal limits   CBC WITH AUTO DIFFERENTIAL - Abnormal; Notable for the following components:    WBC 4.3 (*)     MPV 8.5 (*)     Seg Neutrophils 80.9 (*)     Lymphocytes 8.9 (*) Lymphocytes Absolute 0.4 (*)     All other components within normal limits   BASIC METABOLIC PANEL - Abnormal; Notable for the following components:    Glucose 133 (*)     POC CALCIUM 7.9 (*)     All other components within normal limits   COVID-19, RAPID   RAPID INFLUENZA A/B ANTIGENS   CULTURE, REFLEXED, URINE    Narrative:     Source: urine       Site: transport kit          Current Antibiotics: none   HCG, SERUM, QUALITATIVE   GLOMERULAR FILTRATION RATE, ESTIMATED   ANION GAP     Patient has what appears to be a urinary tract infection. Patient has been given Cipro she is instructed to take it as prescribed. She has been given 1 dose of Diflucan she is instructed to take that at the end of her antibiotic regimen. She is instructed to follow-up with a primary care physician and do so within the next 1 to 2 days. She is instructed to continue to take all medications as prescribed. She is instructed return to the nearest emergency room immediately for any new or worsening complaints. Final diagnoses:   Dysuria   Urinary tract infection without hematuria, site unspecified   . Seen this patient with the resident Dr. Stephanie Rubalcava and agree with his assessment and plan.      Rolf Kidd,   11/25/22 1876

## 2022-11-26 NOTE — ED PROVIDER NOTES
[facility] ENCOUNTER          Pt Name: Brynn Laboy  MRN: [id number]  [place] 1980  Date of evaluation: 2022  Treating Resident Physician: Jerry Campbell MD  Supervising Physician: Dr Oralia Mckenna     No chief complaint on file. History obtained from the patient. HISTORY OF PRESENT ILLNESS    HPI  Brynn Laboy is a 40 y.o. female who presents to the emergency department for evaluation of dysuria, fever and chills x3 days. She had similar symptoms 14 days go and was seen here dx with uti and given bactrim pyridium and zofran. She also has some mild fatigue and congestion. The patient has no other acute complaints at this time. REVIEW OF SYSTEMS   Review of Systems   Constitutional:  Negative for chills and fever. HENT:  Positive for congestion. Negative for rhinorrhea, sinus pain and sore throat. Eyes:  Negative for redness. Respiratory:  Negative for cough and shortness of breath. Cardiovascular:  Negative for chest pain. Gastrointestinal:  Negative for abdominal pain, diarrhea, nausea and vomiting. Genitourinary:  Positive for dysuria. Negative for hematuria, vaginal bleeding, vaginal discharge and vaginal pain. Musculoskeletal:  Negative for back pain. Skin:  Negative for rash. Neurological:  Negative for light-headedness and headaches. Psychiatric/Behavioral:  Negative for agitation.         PAST MEDICAL AND SURGICAL HISTORY     Past Medical History:   Diagnosis Date    Depression      Past Surgical History:   Procedure Laterality Date     SECTION      DILATION AND CURETTAGE OF UTERUS      x2    DILATION AND CURETTAGE OF UTERUS N/A 2021    D & C, HYSTEROSCOPY, ENDOMETRIAL ABLATION, LAPAROSCOPIC BILAT SALPINGECTOMY performed by Umang Lanza MD at [address] [place] [address]   No current facility-administered medications for this encounter. Current Outpatient Medications:     valACYclovir HCl (VALTREX PO), Take by mouth, Disp: , Rfl:     Escitalopram Oxalate (LEXAPRO PO), Take by mouth, Disp: , Rfl:     ondansetron (ZOFRAN ODT) 4 MG disintegrating tablet, Take 1 tablet by mouth every 8 hours as needed for Nausea or Vomiting, Disp: 15 tablet, Rfl: 0    Wild Yam, Dioscorea villosa, (WILD YAM PO), Take  by mouth., Disp: , Rfl:       SOCIAL HISTORY     Social History     Social History Narrative    Not on file     Social History     Tobacco Use    Smoking status: Never    Smokeless tobacco: Never   Vaping Use    Vaping Use: Never used   Substance Use Topics    Alcohol use: Yes     Comment: social    Drug use: Never         ALLERGIES     Allergies   Allergen Reactions    Bravelle [Urofollitropin] Swelling         FAMILY HISTORY   No family history on file. PREVIOUS RECORDS   Previous records reviewed: patient was seen on 5/25/2021 for menorrhagia. PHYSICAL EXAM     ED Triage Vitals [11/25/22 2015]   BP Temp Temp src Heart Rate Resp SpO2 Height Weight   113/64 99.8 °F (37.7 °C) -- (!) 107 18 97 % -- 140 lb (63.5 kg)     Initial vital signs and nursing assessment reviewed and abnormal from tachycardia  . Pulsoximetry is normal per my interpretation. Additional Vital Signs:  Vitals:    11/25/22 2015   BP: 113/64   Pulse: (!) 107   Resp: 18   Temp: 99.8 °F (37.7 °C)   SpO2: 97%       Physical Exam  Vitals and nursing note reviewed. Constitutional:       General: She is not in acute distress. Appearance: She is not toxic-appearing or diaphoretic. HENT:      Head: Normocephalic and atraumatic. Right Ear: External ear normal.      Left Ear: External ear normal.      Nose: Nose normal.      Mouth/Throat:      Mouth: Mucous membranes are moist.      Pharynx: Oropharynx is clear. Eyes:      General: No scleral icterus. Conjunctiva/sclera: Conjunctivae normal.   Cardiovascular:      Rate and Rhythm: Regular rhythm. Tachycardia present. Pulses: Normal pulses. Heart sounds: Normal heart sounds. Pulmonary:      Effort: Pulmonary effort is normal. No respiratory distress. Breath sounds: Normal breath sounds. Abdominal:      General: Abdomen is flat. There is no distension. Palpations: Abdomen is soft. Tenderness: There is no abdominal tenderness. There is no guarding or rebound. Musculoskeletal:         General: Normal range of motion. Cervical back: Normal range of motion and neck supple. No rigidity. No muscular tenderness. Lymphadenopathy:      Cervical: No cervical adenopathy. Skin:     General: Skin is warm and dry. Capillary Refill: Capillary refill takes less than 2 seconds. Coloration: Skin is not jaundiced. Neurological:      General: No focal deficit present. Mental Status: She is alert and oriented to person, place, and time. Psychiatric:         Mood and Affect: Mood normal.         Behavior: Behavior normal.       MEDICAL DECISION MAKING      Differential Diagnosis Considered but not limited to:   Dysuria, pyelonephritis, covid 19, influenza      Work up performed: swabs, cbc, bmp, hcg, ua with culture     Assessment:   Patients labs and UA are pending care transferred with Dr. Juan Silva. ED RESULTS   Laboratory results:  Labs Reviewed   COVID-19, RAPID   RAPID INFLUENZA A/B ANTIGENS   URINALYSIS WITH REFLEX TO CULTURE   CBC WITH AUTO DIFFERENTIAL   BASIC METABOLIC PANEL   HCG, SERUM, QUALITATIVE   GLOMERULAR FILTRATION RATE, ESTIMATED       Radiologic studies results:  No orders to display       ED Medications administered this visit: Medications - No data to display      ED COURSE         MEDICATION CHANGES     New Prescriptions    No medications on file         FINAL DISPOSITION     Final diagnoses:   Dysuria     Condition: condition: stable  Dispo: Per Dr. Juan Silva      This transcription was electronically signed.  Parts of this transcriptions may have been dictated by use of voice recognition software and electronically transcribed, and parts may have been transcribed with the assistance of an ED scribe. The transcription may contain errors not detected in proofreading. Please refer to my supervising physician's documentation if my documentation differs.     Electronically Signed: Dagmar Mckenna MD, 11/25/22, 8:45 PM          Dagmar Mckenna MD  Resident  11/25/22 9125

## 2024-03-15 NOTE — ANESTHESIA POSTPROCEDURE EVALUATION
Department of Anesthesiology  Postprocedure Note    Patient: Aaliyah Hutton  MRN: [id number]  YOB: 1980  Date of evaluation: 5/25/2021  Time:  9:29 AM     Procedure Summary     Date: 05/25/21 Room / Location: [address] [place] [address] 02 / [facility]    Anesthesia Start: 8054 Anesthesia Stop: 6450    Procedure: D & C, HYSTEROSCOPY, ENDOMETRIAL ABLATION, LAPAROSCOPIC BILAT SALPINGECTOMY (N/A Uterus) Diagnosis: (DYSFUNCTIONAL UTERINE BLEEDING, STERILIZATION)    Surgeons: Stephen Orr MD Responsible Provider: Joselyn Peterson DO    Anesthesia Type: general ASA Status: 1          Anesthesia Type: general    Joseph Phase I: Joseph Score: 9    Joseph Phase II: Joseph Score: 9    Last vitals: Reviewed and per EMR flowsheets.        Anesthesia Post Evaluation    Patient location during evaluation: PACU  Patient participation: complete - patient participated  Level of consciousness: awake  Airway patency: patent  Nausea & Vomiting: no nausea  Complications: no  Cardiovascular status: hemodynamically stable  Respiratory status: acceptable  Hydration status: stable Detail Level: Detailed Detail Level: Generalized

## 2024-10-31 ENCOUNTER — LAB (OUTPATIENT)
Dept: LAB | Age: 44
End: 2024-10-31

## 2024-11-08 LAB — CYTOLOGY THIN PREP PAP: NORMAL

## 2025-02-23 ENCOUNTER — HOSPITAL ENCOUNTER (EMERGENCY)
Age: 45
Discharge: HOME | End: 2025-02-23
Attending: FAMILY MEDICINE
Payer: COMMERCIAL

## 2025-02-23 ENCOUNTER — APPOINTMENT (OUTPATIENT)
Dept: GENERAL RADIOLOGY | Age: 45
End: 2025-02-23
Attending: FAMILY MEDICINE
Payer: COMMERCIAL

## 2025-02-23 VITALS
HEART RATE: 96 BPM | DIASTOLIC BLOOD PRESSURE: 82 MMHG | WEIGHT: 160 LBS | BODY MASS INDEX: 27.31 KG/M2 | OXYGEN SATURATION: 98 % | SYSTOLIC BLOOD PRESSURE: 111 MMHG | HEIGHT: 64 IN | TEMPERATURE: 98.8 F | RESPIRATION RATE: 16 BRPM

## 2025-02-23 DIAGNOSIS — J06.9 ACUTE UPPER RESPIRATORY INFECTION: Primary | ICD-10-CM

## 2025-02-23 PROCEDURE — 99283 EMERGENCY DEPT VISIT LOW MDM: CPT

## 2025-02-23 PROCEDURE — 71046 X-RAY EXAM CHEST 2 VIEWS: CPT

## 2025-02-23 RX ORDER — AMOXICILLIN AND CLAVULANATE POTASSIUM 500; 125 MG/1; MG/1
1 TABLET, FILM COATED ORAL 3 TIMES DAILY
Qty: 30 TABLET | Refills: 0 | Status: SHIPPED | OUTPATIENT
Start: 2025-02-23 | End: 2025-03-05

## 2025-02-23 RX ORDER — GUAIFENESIN/DEXTROMETHORPHAN 100-10MG/5
5 SYRUP ORAL 3 TIMES DAILY PRN
Qty: 120 ML | Refills: 0 | Status: SHIPPED | OUTPATIENT
Start: 2025-02-23 | End: 2025-03-05

## 2025-02-23 NOTE — ED NOTES
Pt. Presents ambulatory to ED with c/o fllu like symptoms since Tuesday.  Pt. Reports her family has tested positive for flu A and she is currently taking tamiflu.  Pt. Requesting CXR to make sure she does not have pneumonia.

## 2025-02-23 NOTE — DISCHARGE INSTR - COC
patient):  {CHP DME Belongings:430507553}    RN SIGNATURE:  {Esignature:[id number]}    CASE MANAGEMENT/SOCIAL WORK SECTION    Inpatient Status Date: ***    Readmission Risk Assessment Score:  [facility] RISK OF UNPLANNED READMISSION 2.0             0 Total Score        Discharging to Facility/ Agency   Name:   Address:  Phone:  Fax:    Dialysis Facility (if applicable)   Name:  Address:  Dialysis Schedule:  Phone:  Fax:    / signature: {Esignature:[id number]}    PHYSICIAN SECTION    Prognosis: {Prognosis:5817161537}    Condition at Discharge: { Patient Condition:377889844}    Rehab Potential (if transferring to Rehab): {Prognosis:4986189322}    Recommended Labs or Other Treatments After Discharge: ***    Physician Certification: I certify the above information and transfer of Kathy Deleon  is necessary for the continuing treatment of the diagnosis listed and that she requires {Admit to Appropriate Level of Care:77249} for {GREATER/LESS:591307374} 30 days.     Update Admission H&P: {CHP DME Changes in HandP:456801512}    PHYSICIAN SIGNATURE:  {Esignature:[id number]}

## 2025-02-23 NOTE — DISCHARGE INSTRUCTIONS
Kathy Deleon,    It has been my absolute pleasure to serve you while in the emergency department at [facility] today.  Please do remember to take all medications as prescribed.  Please make sure you follow-up with your PCP within 7 days.  Please follow-up with any and all specialists as we discussed.  Please return to the ER in case of any worsening of symptoms.  I wish you a speedy recovery!    Sincerely,    Dr. Mic Mo MD.

## 2025-02-23 NOTE — ED PROVIDER NOTES
[facility]  eMERGENCY dEPARTMENT eNCOUnter          CHIEF COMPLAINT       Chief Complaint   Patient presents with    Cough    Sinusitis    concerned for pneumonia       Nurses Notes reviewed and I agree except as noted in the HPI.    HISTORY OF PRESENT ILLNESS    Kathy Deleon is a 44 y.o. female who presents to the Emergency Department for the evaluation of cough.  Patient has a history of MALT Lymphoma treated with radiation therapy.  She also has cystic lung disease and Sjogren's disease.  PET/CT on 2024 showed no evidence of FDG avid lesions and stable multiple cystic lesions and intralesional nodules.  She says that her kids were sick last week with influenza.  Her PCP put her on Tamiflu.  She says her symptoms started earlier this week on Tuesday.  She says she is not on any immunosuppressive medications.      The HPI was provided by the patient.     REVIEW OF SYSTEMS       Constitutional: well hydrated, no acute distress  Eyes: no vision changes  Ears, Nose, Mouth, Throat: no hearing disturbance, no nasal swelling, no epistaxis, no difficulty swallowing  Cardiovascular: no chest pains  Respiratory: + SOB, + cough  Gastrointestinal: no abdominal pain, no nausea, no vomiting, no diarrhea  Genitourinary: no change in urinary frequency, no dysuria symptoms  Musculoskeletal: no joint pains, no muscle pains  Integumentary (skin and/or breast): no rashes, no swelling, no redness  Neurological: no weakness, no facial droop, no confusion  Psychiatric: no depression, no anxiety    MEDICAL HISTORY    has a past medical history of Depression, History of lung biopsy, and Lymphoma in remission.    SURGICAL HISTORY      has a past surgical history that includes  section; Dilation and curettage of uterus; and Dilation and curettage of uterus (N/A, 2021).    CURRENT MEDICATIONS       Discharge Medication List as of 2025 11:29 AM        CONTINUE these medications which have NOT

## (undated) DEVICE — LAPAROSCOPY PACK: Brand: CONVERTORS

## (undated) DEVICE — Z DUPLICATE USE 2431315 SET INSUF TBNG HI FLO W/ SMK EVAC FOR PNEUMOCLEAR

## (undated) DEVICE — DRAPE,UNDERBUTTOCKS,PCH,STERILE: Brand: MEDLINE

## (undated) DEVICE — SEALER ENDOSCP L37CM NANO COAT BLNT TIP LAP DIV

## (undated) DEVICE — GLOVE SURG SZ 65 THK91MIL LTX FREE SYN POLYISOPRENE

## (undated) DEVICE — SUTURE VCRL SZ 4-0 L27IN ABSRB UD L19MM FS-2 3/8 CIR REV J422H

## (undated) DEVICE — HANDPIECE ABLAT DISP FOR ENDOMET SYS

## (undated) DEVICE — Y-TYPE TUR/BLADDER IRRIGATION SET, REGULATING CLAMP